# Patient Record
Sex: FEMALE | Race: ASIAN | Employment: FULL TIME | URBAN - METROPOLITAN AREA
[De-identification: names, ages, dates, MRNs, and addresses within clinical notes are randomized per-mention and may not be internally consistent; named-entity substitution may affect disease eponyms.]

---

## 2022-10-24 ENCOUNTER — APPOINTMENT (OUTPATIENT)
Dept: GENERAL RADIOLOGY | Age: 42
End: 2022-10-24
Attending: PHYSICIAN ASSISTANT

## 2022-10-24 ENCOUNTER — HOSPITAL ENCOUNTER (EMERGENCY)
Age: 42
Discharge: HOME | End: 2022-10-24
Attending: EMERGENCY MEDICINE

## 2022-10-24 VITALS
SYSTOLIC BLOOD PRESSURE: 121 MMHG | DIASTOLIC BLOOD PRESSURE: 71 MMHG | HEART RATE: 93 BPM | RESPIRATION RATE: 18 BRPM | TEMPERATURE: 98 F | OXYGEN SATURATION: 98 %

## 2022-10-24 DIAGNOSIS — M25.561 ACUTE PAIN OF RIGHT KNEE: Primary | ICD-10-CM

## 2022-10-24 DIAGNOSIS — M25.461 EFFUSION OF RIGHT KNEE: ICD-10-CM

## 2022-10-24 PROCEDURE — 73562 X-RAY EXAM OF KNEE 3: CPT

## 2022-10-24 PROCEDURE — 99283 EMERGENCY DEPT VISIT LOW MDM: CPT

## 2022-10-24 RX ORDER — IBUPROFEN 600 MG/1
600 TABLET ORAL
Qty: 20 TABLET | Refills: 0 | Status: SHIPPED | OUTPATIENT
Start: 2022-10-24

## 2022-10-24 RX ORDER — PREDNISONE 50 MG/1
50 TABLET ORAL DAILY
Qty: 3 TABLET | Refills: 0 | Status: SHIPPED | OUTPATIENT
Start: 2022-10-24 | End: 2022-10-27

## 2022-10-24 NOTE — ED NOTES
MD reviewed discharge instructions and options with patient and patient verbalized understanding. RN reviewed discharge instructions using teachback method. Pt ambulated to exit without difficulty and in no signs of acute distress escorted by male , and he  will drive home. No complaints or needs expressed at this time. Patient was counseled on medications prescribed at discharge. VSS, verbalized relief from most intense pain. Patient to call Dr. Gina Montez in the morning for appointment.

## 2022-10-24 NOTE — DISCHARGE INSTRUCTIONS
Return for new or worsening symptoms such as fever, inability to bend the knee or bear weight. Take your medications as directed. Follow the given RICE instructions. If not improving in 3 to 5 days, make a follow-up appointment with the orthopedic physician as discussed.

## 2022-10-24 NOTE — ED TRIAGE NOTES
Pt c/o RIGHT knee pain and swelling that radiates down the front of the leg for a month. Pt denies injury. Ambulatory in triage.

## 2022-11-17 NOTE — ED PROVIDER NOTES
35year old F presenting to the ED for RIGHT knee pain. Pt reports pain and swelling - ongoing x about 3 weeks. No known injury. Does stand a lot at work. No fever. Pt reports much worse pain when she attempts to stand after sitting. Also hurts to flex the knee. Patient localizes pain to the lateral aspect of the knee and notes that it sometimes radiates down the shin. No swelling in the calf or lower leg. Patient denies hx VTE, recent immobilization, exogenous estrogen use. Has tried Tylenol with just minimal relief. PMHx: denies  Psx: lipoma removal  Social: non-smoker. Labels boxes  NKDA    The history is provided by the patient. Leg Pain        No past medical history on file. No past surgical history on file. No family history on file. Social History     Socioeconomic History    Marital status:      Spouse name: Not on file    Number of children: Not on file    Years of education: Not on file    Highest education level: Not on file   Occupational History    Not on file   Tobacco Use    Smoking status: Not on file    Smokeless tobacco: Not on file   Substance and Sexual Activity    Alcohol use: Not on file    Drug use: Not on file    Sexual activity: Not on file   Other Topics Concern    Not on file   Social History Narrative    Not on file     Social Determinants of Health     Financial Resource Strain: Not on file   Food Insecurity: Not on file   Transportation Needs: Not on file   Physical Activity: Not on file   Stress: Not on file   Social Connections: Not on file   Intimate Partner Violence: Not on file   Housing Stability: Not on file         ALLERGIES: Patient has no known allergies. Review of Systems   Constitutional:  Negative for fever. HENT:  Negative for facial swelling. Eyes:  Negative for visual disturbance. Respiratory:  Negative for shortness of breath. Cardiovascular:  Negative for chest pain. Gastrointestinal:  Negative for vomiting. Musculoskeletal:  Positive for arthralgias and joint swelling. Skin:  Negative for wound. Neurological:  Negative for syncope. All other systems reviewed and are negative. Vitals:    10/24/22 1636   BP: 121/71   Pulse: 93   Resp: 18   Temp: 98 °F (36.7 °C)   SpO2: 98%            Physical Exam  Vitals and nursing note reviewed. Constitutional:       Appearance: She is well-developed. Comments: Pleasant, well-appearing, no distress   HENT:      Head: Normocephalic. Eyes:      Conjunctiva/sclera: Conjunctivae normal.   Cardiovascular:      Rate and Rhythm: Normal rate. Pulmonary:      Effort: Pulmonary effort is normal. No respiratory distress. Musculoskeletal:         General: Normal range of motion. Cervical back: Neck supple. Comments: Right knee: Exposed for exam.  No abnormal warmth when compared to the left knee. No erythema. Patient able to fully flex and extend the knee, ambulatory with a slight limp. Patient has pain with anterior/posterior and valgus/varus stress but no appreciable laxity. No calf edema or tenderness. Normal distal pulses. Patient does have some tenderness over the lateral joint line. Skin:     General: Skin is warm and dry. Neurological:      Mental Status: She is alert and oriented to person, place, and time. MDM  Number of Diagnoses or Management Options  Diagnosis management comments: 49-year-old female presenting to the ED for about 3 weeks of right knee pain. Does a lot of standing at work. Pain does somewhat radiate down the shin but there is no swelling, tenderness, calf pain concerning for VTE and no risk factors. X-ray showing small joint effusion. No exam findings concerning for gouty or septic arthritis. Discussed possible causes of pain with patient, recommended use of RICE, 3-day burst of steroids, NSAID, Ortho follow-up if not improving.        Amount and/or Complexity of Data Reviewed  Tests in the radiology section of CPT®: ordered and reviewed  Discuss the patient with other providers: yes (Dr. Sissy Figueroa, ED attending)           Procedures Griseofulvin Counseling:  I discussed with the patient the risks of griseofulvin including but not limited to photosensitivity, cytopenia, liver damage, nausea/vomiting and severe allergy.  The patient understands that this medication is best absorbed when taken with a fatty meal (e.g., ice cream or french fries).